# Patient Record
Sex: MALE | Race: BLACK OR AFRICAN AMERICAN | NOT HISPANIC OR LATINO | Employment: UNEMPLOYED | URBAN - METROPOLITAN AREA
[De-identification: names, ages, dates, MRNs, and addresses within clinical notes are randomized per-mention and may not be internally consistent; named-entity substitution may affect disease eponyms.]

---

## 2020-09-14 PROBLEM — N52.9 ERECTILE DYSFUNCTION: Status: ACTIVE | Noted: 2020-09-14

## 2020-09-14 PROBLEM — N18.4 CKD (CHRONIC KIDNEY DISEASE) STAGE 4, GFR 15-29 ML/MIN: Status: ACTIVE | Noted: 2020-09-14

## 2020-09-14 PROBLEM — I25.10 CORONARY ARTERY DISEASE INVOLVING NATIVE CORONARY ARTERY OF NATIVE HEART WITHOUT ANGINA PECTORIS: Status: ACTIVE | Noted: 2020-09-14

## 2020-09-14 PROBLEM — E78.5 HYPERLIPIDEMIA: Status: ACTIVE | Noted: 2020-09-14

## 2020-09-14 PROBLEM — Z82.49 FAMILY HISTORY OF HEART DISEASE: Status: ACTIVE | Noted: 2020-09-14

## 2020-09-14 PROBLEM — I63.9 CEREBROVASCULAR ACCIDENT (CVA): Status: ACTIVE | Noted: 2020-09-14

## 2020-09-14 PROBLEM — Z95.1 S/P CABG (CORONARY ARTERY BYPASS GRAFT): Status: ACTIVE | Noted: 2020-09-14

## 2020-09-14 PROBLEM — N18.4 CKD (CHRONIC KIDNEY DISEASE), STAGE IV: Status: ACTIVE | Noted: 2020-09-14

## 2020-09-14 PROBLEM — I10 ESSENTIAL HYPERTENSION: Status: ACTIVE | Noted: 2020-09-14

## 2020-09-14 PROBLEM — Z87.891 HISTORY OF TOBACCO ABUSE: Status: ACTIVE | Noted: 2020-09-14

## 2020-09-14 PROBLEM — R06.09 DYSPNEA ON EXERTION: Status: ACTIVE | Noted: 2020-09-14

## 2020-11-08 PROBLEM — Z79.01 ANTICOAGULATED: Status: RESOLVED | Noted: 2020-11-08 | Resolved: 2020-11-08

## 2020-11-08 PROBLEM — I65.23 BILATERAL CAROTID ARTERY STENOSIS: Status: ACTIVE | Noted: 2020-11-08

## 2020-11-08 PROBLEM — D64.9 ANEMIA: Status: ACTIVE | Noted: 2020-11-08

## 2020-11-08 PROBLEM — I51.7 LEFT VENTRICULAR HYPERTROPHY: Status: ACTIVE | Noted: 2020-11-08

## 2020-11-08 PROBLEM — I51.89 DIASTOLIC DYSFUNCTION: Status: ACTIVE | Noted: 2020-11-08

## 2020-11-08 PROBLEM — Z79.01 ANTICOAGULATED: Status: ACTIVE | Noted: 2020-11-08

## 2020-11-08 PROBLEM — R00.1 BRADYCARDIA: Status: ACTIVE | Noted: 2020-11-08

## 2020-11-08 PROBLEM — I51.89 LEFT VENTRICULAR HYPOKINESIS: Status: ACTIVE | Noted: 2020-11-08

## 2020-11-09 PROBLEM — N18.4 CKD (CHRONIC KIDNEY DISEASE) STAGE 4, GFR 15-29 ML/MIN: Status: RESOLVED | Noted: 2020-09-14 | Resolved: 2020-11-09

## 2020-11-09 PROBLEM — Z79.01 ON ANTICOAGULANT THERAPY: Status: RESOLVED | Noted: 2020-11-08 | Resolved: 2020-11-09

## 2020-12-04 PROBLEM — N18.4 CKD (CHRONIC KIDNEY DISEASE) STAGE 4, GFR 15-29 ML/MIN: Status: ACTIVE | Noted: 2020-12-04

## 2021-02-11 PROBLEM — I42.9 CARDIOMYOPATHY: Status: ACTIVE | Noted: 2021-02-11

## 2021-02-11 PROBLEM — I63.9 CEREBROVASCULAR ACCIDENT (CVA): Status: RESOLVED | Noted: 2020-09-14 | Resolved: 2021-02-11

## 2021-02-11 PROBLEM — I50.20 HEART FAILURE WITH REDUCED EJECTION FRACTION, NYHA CLASS II: Status: ACTIVE | Noted: 2021-02-11

## 2021-02-11 PROBLEM — I51.89 LEFT VENTRICULAR HYPOKINESIS: Status: RESOLVED | Noted: 2020-11-08 | Resolved: 2021-02-11

## 2021-02-11 PROBLEM — N18.4 CKD (CHRONIC KIDNEY DISEASE), STAGE IV: Status: RESOLVED | Noted: 2020-09-14 | Resolved: 2021-02-11

## 2021-02-12 ENCOUNTER — NURSE TRIAGE (OUTPATIENT)
Dept: ADMINISTRATIVE | Facility: CLINIC | Age: 51
End: 2021-02-12

## 2021-05-12 PROBLEM — Z91.199 NONCOMPLIANCE: Status: ACTIVE | Noted: 2021-05-12

## 2021-05-18 PROBLEM — N17.9 ACUTE KIDNEY INJURY SUPERIMPOSED ON CHRONIC KIDNEY DISEASE: Status: ACTIVE | Noted: 2020-12-04

## 2021-05-18 PROBLEM — N18.9 ACUTE KIDNEY INJURY SUPERIMPOSED ON CHRONIC KIDNEY DISEASE: Status: ACTIVE | Noted: 2020-12-04

## 2021-05-18 PROBLEM — R07.89 OTHER CHEST PAIN: Status: ACTIVE | Noted: 2021-05-18

## 2021-05-18 PROBLEM — I21.4 NSTEMI (NON-ST ELEVATED MYOCARDIAL INFARCTION): Status: ACTIVE | Noted: 2021-05-18

## 2021-05-18 PROBLEM — R07.9 CHEST PAIN: Status: ACTIVE | Noted: 2021-05-18

## 2021-05-19 PROBLEM — N18.9 ACUTE KIDNEY INJURY SUPERIMPOSED ON CHRONIC KIDNEY DISEASE: Status: RESOLVED | Noted: 2020-12-04 | Resolved: 2021-05-19

## 2021-05-19 PROBLEM — N17.9 ACUTE KIDNEY INJURY SUPERIMPOSED ON CHRONIC KIDNEY DISEASE: Status: RESOLVED | Noted: 2020-12-04 | Resolved: 2021-05-19

## 2021-05-19 PROBLEM — I21.4 NSTEMI (NON-ST ELEVATED MYOCARDIAL INFARCTION): Status: RESOLVED | Noted: 2021-05-18 | Resolved: 2021-05-19

## 2021-05-19 PROBLEM — R07.89 OTHER CHEST PAIN: Status: RESOLVED | Noted: 2021-05-18 | Resolved: 2021-05-19

## 2021-05-21 PROBLEM — Z86.73 HISTORY OF STROKE: Status: ACTIVE | Noted: 2021-05-21

## 2021-06-09 ENCOUNTER — TELEPHONE (OUTPATIENT)
Dept: TRANSPLANT | Facility: CLINIC | Age: 51
End: 2021-06-09

## 2022-02-15 PROBLEM — R13.10 DYSPHAGIA: Status: ACTIVE | Noted: 2022-02-15

## 2023-02-14 ENCOUNTER — TELEPHONE (OUTPATIENT)
Dept: TRANSPLANT | Facility: CLINIC | Age: 53
End: 2023-02-14
Payer: COMMERCIAL

## 2023-02-16 ENCOUNTER — TELEPHONE (OUTPATIENT)
Dept: TRANSPLANT | Facility: CLINIC | Age: 53
End: 2023-02-16
Payer: COMMERCIAL

## 2023-02-16 NOTE — TELEPHONE ENCOUNTER
Contacted patient to review initial intake information. Patient reports the followin. Can you walk up a flight of stairs without getting short of breath or stopping? No   2. Can you walk one block without getting short of breath or having to stop? No   3. Do you use oxygen? No   4. Do you use a cane, walker, or wheel chair to assist in mobility? No   5. Have you been hospitalized or had recent surgery in the last 6 months? No    A. Stroke   B. Heart surgery or heart catheterization   C. Broken bone  6. Do you have any cuts, open sores (ulcers), or wounds anywhere on your body? No   7. Do you go to dialysis? Yes What days? T,T,S  8. Preferred appointment day? Weds  9. Caregiver? Wife (Mariajose)      Patient is aware the next steps will include completing records and compliance verification. Patient is aware once provider review and insurance authorization is received we will contact patient to schedule initial visit. Patient is aware that initial visit will begin prior to / at 7 am and will conclude at approximately 3 pm on date of appointment. All questions answered at this time.

## 2023-02-20 ENCOUNTER — TELEPHONE (OUTPATIENT)
Dept: TRANSPLANT | Facility: CLINIC | Age: 53
End: 2023-02-20

## 2023-03-15 ENCOUNTER — TELEPHONE (OUTPATIENT)
Dept: TRANSPLANT | Facility: CLINIC | Age: 53
End: 2023-03-15
Payer: COMMERCIAL

## 2023-03-28 ENCOUNTER — TELEPHONE (OUTPATIENT)
Dept: TRANSPLANT | Facility: CLINIC | Age: 53
End: 2023-03-28
Payer: COMMERCIAL

## 2023-03-30 DIAGNOSIS — Z76.82 ORGAN TRANSPLANT CANDIDATE: Primary | ICD-10-CM

## 2023-04-19 ENCOUNTER — TELEPHONE (OUTPATIENT)
Dept: TRANSPLANT | Facility: CLINIC | Age: 53
End: 2023-04-19
Payer: COMMERCIAL

## 2023-04-19 NOTE — TELEPHONE ENCOUNTER
MA notes per adherence form/Epifanio Barragan (LUCILLE) [facility] ph# [phone]    FOR THE PAST THREE MONTHS:    5-AMA's 01/21/23 22 min, 02/04/23 126 min, 03/14/23 77 min, 03/21/23 35 min, per pt request 02/11/23 45 min, for doctor appt     1-No-shows 01/14/23 due to illness or trauma.    No concerns with care giving, transportation, or mental health    Brought over to clinic to be scanned in.    Antonette Leija  Abdominal Transplant MA

## 2023-05-03 ENCOUNTER — HOSPITAL ENCOUNTER (OUTPATIENT)
Dept: RADIOLOGY | Facility: HOSPITAL | Age: 53
Discharge: HOME | End: 2023-05-03
Payer: MEDICARE

## 2023-05-03 ENCOUNTER — OFFICE VISIT (OUTPATIENT)
Dept: TRANSPLANT | Facility: CLINIC | Age: 53
End: 2023-05-03
Payer: MEDICARE

## 2023-05-03 ENCOUNTER — TELEPHONE (OUTPATIENT)
Dept: TRANSPLANT | Facility: CLINIC | Age: 53
End: 2023-05-03
Payer: MEDICARE

## 2023-05-03 ENCOUNTER — HOSPITAL ENCOUNTER (OUTPATIENT)
Dept: RADIOLOGY | Facility: HOSPITAL | Age: 53
Discharge: HOME | End: 2023-05-03
Attending: NURSE PRACTITIONER
Payer: MEDICARE

## 2023-05-03 VITALS
RESPIRATION RATE: 16 BRPM | WEIGHT: 113.75 LBS | SYSTOLIC BLOOD PRESSURE: 155 MMHG | HEIGHT: 66 IN | TEMPERATURE: 97 F | OXYGEN SATURATION: 97 % | HEART RATE: 73 BPM | DIASTOLIC BLOOD PRESSURE: 88 MMHG | BODY MASS INDEX: 18.28 KG/M2

## 2023-05-03 DIAGNOSIS — Z01.818 PRE-TRANSPLANT EVALUATION FOR KIDNEY TRANSPLANT: Primary | ICD-10-CM

## 2023-05-03 DIAGNOSIS — I10 ESSENTIAL HYPERTENSION: ICD-10-CM

## 2023-05-03 DIAGNOSIS — Z76.82 ORGAN TRANSPLANT CANDIDATE: ICD-10-CM

## 2023-05-03 DIAGNOSIS — Z87.891 HISTORY OF TOBACCO ABUSE: ICD-10-CM

## 2023-05-03 DIAGNOSIS — E78.2 MIXED HYPERLIPIDEMIA: ICD-10-CM

## 2023-05-03 DIAGNOSIS — N18.6 ESRD ON HEMODIALYSIS: ICD-10-CM

## 2023-05-03 DIAGNOSIS — Z99.2 ESRD ON HEMODIALYSIS: ICD-10-CM

## 2023-05-03 DIAGNOSIS — I25.10 CORONARY ARTERY DISEASE INVOLVING NATIVE CORONARY ARTERY OF NATIVE HEART WITHOUT ANGINA PECTORIS: ICD-10-CM

## 2023-05-03 PROCEDURE — 99203 OFFICE O/P NEW LOW 30 MIN: CPT | Mod: S$GLB,TXP,, | Performed by: TRANSPLANT SURGERY

## 2023-05-03 PROCEDURE — 71046 X-RAY EXAM CHEST 2 VIEWS: CPT | Mod: TC,TXP

## 2023-05-03 PROCEDURE — 1159F PR MEDICATION LIST DOCUMENTED IN MEDICAL RECORD: ICD-10-PCS | Mod: CPTII,S$GLB,TXP, | Performed by: NURSE PRACTITIONER

## 2023-05-03 PROCEDURE — 1160F RVW MEDS BY RX/DR IN RCRD: CPT | Mod: CPTII,S$GLB,TXP, | Performed by: NURSE PRACTITIONER

## 2023-05-03 PROCEDURE — 99205 OFFICE O/P NEW HI 60 MIN: CPT | Mod: S$GLB,TXP,, | Performed by: PHYSICIAN ASSISTANT

## 2023-05-03 PROCEDURE — 72170 X-RAY EXAM OF PELVIS: CPT | Mod: TC,TXP

## 2023-05-03 PROCEDURE — 3008F BODY MASS INDEX DOCD: CPT | Mod: CPTII,S$GLB,TXP, | Performed by: NURSE PRACTITIONER

## 2023-05-03 PROCEDURE — 76770 US EXAM ABDO BACK WALL COMP: CPT | Mod: TC,TXP

## 2023-05-03 PROCEDURE — 93978 VASCULAR STUDY: CPT | Mod: TC,TXP

## 2023-05-03 PROCEDURE — 1160F PR REVIEW ALL MEDS BY PRESCRIBER/CLIN PHARMACIST DOCUMENTED: ICD-10-PCS | Mod: CPTII,S$GLB,TXP, | Performed by: NURSE PRACTITIONER

## 2023-05-03 PROCEDURE — 93978 VASCULAR STUDY: CPT | Mod: 26,TXP,, | Performed by: STUDENT IN AN ORGANIZED HEALTH CARE EDUCATION/TRAINING PROGRAM

## 2023-05-03 PROCEDURE — 3079F DIAST BP 80-89 MM HG: CPT | Mod: CPTII,S$GLB,TXP, | Performed by: NURSE PRACTITIONER

## 2023-05-03 PROCEDURE — 99205 OFFICE O/P NEW HI 60 MIN: CPT | Mod: S$GLB,TXP,, | Performed by: NURSE PRACTITIONER

## 2023-05-03 PROCEDURE — 3079F PR MOST RECENT DIASTOLIC BLOOD PRESSURE 80-89 MM HG: ICD-10-PCS | Mod: CPTII,S$GLB,TXP, | Performed by: NURSE PRACTITIONER

## 2023-05-03 PROCEDURE — 3077F SYST BP >= 140 MM HG: CPT | Mod: CPTII,S$GLB,TXP, | Performed by: NURSE PRACTITIONER

## 2023-05-03 PROCEDURE — 72170 X-RAY EXAM OF PELVIS: CPT | Mod: 26,TXP,, | Performed by: RADIOLOGY

## 2023-05-03 PROCEDURE — 72170 XR PELVIS ROUTINE AP: ICD-10-PCS | Mod: 26,TXP,, | Performed by: RADIOLOGY

## 2023-05-03 PROCEDURE — 71046 X-RAY EXAM CHEST 2 VIEWS: CPT | Mod: 26,TXP,, | Performed by: RADIOLOGY

## 2023-05-03 PROCEDURE — 4010F PR ACE/ARB THEARPY RXD/TAKEN: ICD-10-PCS | Mod: CPTII,S$GLB,TXP, | Performed by: NURSE PRACTITIONER

## 2023-05-03 PROCEDURE — 1159F MED LIST DOCD IN RCRD: CPT | Mod: CPTII,S$GLB,TXP, | Performed by: NURSE PRACTITIONER

## 2023-05-03 PROCEDURE — 99999 PR PBB SHADOW E&M-EST. PATIENT-LVL IV: CPT | Mod: PBBFAC,TXP,, | Performed by: NURSE PRACTITIONER

## 2023-05-03 PROCEDURE — 99999 PR PBB SHADOW E&M-EST. PATIENT-LVL IV: ICD-10-PCS | Mod: PBBFAC,TXP,, | Performed by: NURSE PRACTITIONER

## 2023-05-03 PROCEDURE — 76770 US RETROPERITONEAL COMPLETE: ICD-10-PCS | Mod: 26,TXP,, | Performed by: STUDENT IN AN ORGANIZED HEALTH CARE EDUCATION/TRAINING PROGRAM

## 2023-05-03 PROCEDURE — 99205 PR OFFICE/OUTPT VISIT, NEW, LEVL V, 60-74 MIN: ICD-10-PCS | Mod: S$GLB,TXP,, | Performed by: NURSE PRACTITIONER

## 2023-05-03 PROCEDURE — 93978 US DOPP ILIACS BILATERAL: ICD-10-PCS | Mod: 26,TXP,, | Performed by: STUDENT IN AN ORGANIZED HEALTH CARE EDUCATION/TRAINING PROGRAM

## 2023-05-03 PROCEDURE — 99205 PR OFFICE/OUTPT VISIT, NEW, LEVL V, 60-74 MIN: ICD-10-PCS | Mod: S$GLB,TXP,, | Performed by: PHYSICIAN ASSISTANT

## 2023-05-03 PROCEDURE — 99203 PR OFFICE/OUTPT VISIT, NEW, LEVL III, 30-44 MIN: ICD-10-PCS | Mod: S$GLB,TXP,, | Performed by: TRANSPLANT SURGERY

## 2023-05-03 PROCEDURE — 4010F ACE/ARB THERAPY RXD/TAKEN: CPT | Mod: CPTII,S$GLB,TXP, | Performed by: NURSE PRACTITIONER

## 2023-05-03 PROCEDURE — 76770 US EXAM ABDO BACK WALL COMP: CPT | Mod: 26,TXP,, | Performed by: STUDENT IN AN ORGANIZED HEALTH CARE EDUCATION/TRAINING PROGRAM

## 2023-05-03 PROCEDURE — 3077F PR MOST RECENT SYSTOLIC BLOOD PRESSURE >= 140 MM HG: ICD-10-PCS | Mod: CPTII,S$GLB,TXP, | Performed by: NURSE PRACTITIONER

## 2023-05-03 PROCEDURE — 71046 XR CHEST PA AND LATERAL: ICD-10-PCS | Mod: 26,TXP,, | Performed by: RADIOLOGY

## 2023-05-03 PROCEDURE — 3008F PR BODY MASS INDEX (BMI) DOCUMENTED: ICD-10-PCS | Mod: CPTII,S$GLB,TXP, | Performed by: NURSE PRACTITIONER

## 2023-05-03 RX ORDER — HYDRALAZINE HYDROCHLORIDE 25 MG/1
25 TABLET, FILM COATED ORAL 3 TIMES DAILY
COMMUNITY

## 2023-05-03 NOTE — PROGRESS NOTES
INITIAL PATIENT EDUCATION NOTE    Mr. Arcelia Marin was seen in pre-kidney transplant clinic for evaluation for kidney, kidney/pancreas or pancreas only transplant.  The patient attended a an individual video education session that discussed/reviewed the following aspects of transplantation: evaluation including diagnostic and laboratory testing,( Chemistries, Hematology, Serologies including HIV and Hepatitis and HLA) required for transplantation and selection committee process, UNOS waitlist management/multiple listings, types of organs offered (KDPI < 85%, KDPI > 85%, PHS risk, DCD, HCV+, HIV+ for HIV+ recipients and enbloc/dual), financial aspects, surgical procedures, dietary instruction pre- and post-transplant, health maintenance pre- and post-transplant, post-transplant hospitalization and outpatient follow-up, potential to participate in a research protocol, and medication management and side effects.  A question and answer session was provided after the presentation.    The patient was seen by all members of the multi-disciplinary team to include: Nephrologist/ANDREI, Surgeon, , Transplant Coordinator, , Pharmacist and Dietician (if applicable).    The patient reviewed and signed all consents for evaluation which were witnessed and sent to scanning into the [facility] chart.    The patient was given an education book and plan for further evaluation based on his individual assessment.      Reviewed program requirement for complete COVID vaccination with documentation prior to listing.  COVID education information reviewed with patient. Patient encouraged to be up to date on all vaccinations.       The patient was informed that the transplant team would manage immediate post op pain. If the patient requires long term pain management, they will need to have that pain management addressed by their PCP or previous provider who wrote for long term pain medicines.    The patient  was encouraged to call with any questions or concerns.

## 2023-05-03 NOTE — PROGRESS NOTES
Transplant Recipient Adult Psychosocial Assessment    Pt presents to clinic visit with pt's wife, Mariajose Marin.     Arcelia DOMINGUEZ [address]  [facility] [address]  Telephone Information:   Mobile [phone]   Home  [phone] (home)  Work  There is no work phone number on file.  E-mail  No e-mail address on record    Sex: male  YOB: 1970  Age: 52 y.o.    Encounter Date: 5/3/2023  [place] Citizen: yes  Primary Language: English   Needed: no    Emergency Contact:  Name: Mariajose Marin  Relationship: wife  Address: Same as pt  Phone Numbers:  [phone] (mobile)    Family/Social Support:   Number of dependents/: Pt denies.   Marital history: Pt  1 time to current spouse, Mariajose Marin, for 27 years.   Other family dynamics: Pt reports both parents are . Pt has 1 sister and 1 brother- in [facility], close relationship with both. Pt has 3 adult stepchildren: Destin Schulte, Selwyn Ellison, Carrie Scot ([place], [place], & [place]).     Household Composition:  Name: Mariajose Marin; works FT at [employer], [phone]  Age: 56  Relationship: wife  Does person drive? yes    Do you and your caregivers have access to reliable transportation? yes  PRIMARY CAREGIVER: Pt's wife, Mariajose Marin, will be primary caregiver, phone number [phone]     Able to take time off work without financial concerns: yes. Pt's wife reports working at [employer] for 10 years and has PTO, STD, LTD, and FMLA available.     Additional Significant Others who will Assist with Transplant:  Pt unable to determine backup caregiver at this time. Pt agrees to update SW team as needed with any changes to caregiver plan.     Living Will: no .  Healthcare Power of : no  Advance Directives on file: <<no information> per medical record.  Verbally reviewed LW/HCPA information.   provided patient with copy of LW/HCPA documents and provided education on completion of  "forms    Living Donors: No. Education and resource information given to patient.    Highest Education Level: High School (9-12) or GED  Reading Ability: 12th grade  Reports difficulty with: N/A  Learns Best By:  Multisensory info      Status: no  VA Benefits: no     Working for Income: No  If no, reason not working: Disability  Spouse/Significant Other Employment: Pt's wife reports working at [employer] for 10 years and has PTO, STD, LTD, and FMLA available.     Disabled: yes: date disability began: , due to: ESRD.    Monthly Income:   Disability: $1600 /month  Pt's wife's wages- around $1,000/month    Insurance:   Payer/Plan Subscr  Sex Relation Sub. Ins. ID Effective Group Num   1. [employer] MANAGE* MOISÉS KNOWLES * 1970 Male Self [id number]                                   [address] [address]     Primary Insurance (for UNOS reporting): Public Insurance - Medicare & Choice  Secondary Insurance (for UNOS reporting): None    Dialysis Adherence: Patient reports on TTS schedule at [facility] ([phone]).  Dialysis center reports over last three months that the patient has had 5 AMAs, 1 no-shows, and no concerns with caregiving, transportation, or mental health. Full compliance report found under "Media" tab.   LUCILLE discussed importance of satisfactory compliance with pt. Pt reports shortened treatments due to sickness and throwing up from having too much weight pulled off during dialysis treatments. Pt states he is unable to have PD due to scar tissue.   LUCILLE spoke with dialysis nurse, Daiana RN who reports pt is "very compliant" with treatments, binders, and diet.     Infusion Service: patient utilizing? yes, iron as needed  Home Health: patient utilizing? no  DME: yes, [facility]  Pulmonary/Cardiac Rehab: Pt denies   ADLS:  Pt reports independent with all ADLS, drives, and handles own medication management.      Adherence:    Pt reports is highly compliant with all medical appointments and " "instructions.  Adherence education and counseling provided.     Per History Section:  Past Medical History:   Diagnosis Date    Allergy     Anemia     CHF (congestive heart failure)     Coronary artery disease     CVA (cerebral vascular accident)     Encounter for blood transfusion     Heart disease     Heart failure with reduced ejection fraction, NYHA class II 02/11/2021    Hypertension     Tobacco dependence     Vitamin D deficiency      Social History     Tobacco Use    Smoking status: Every Day     Packs/day: 0.50     Types: Cigarettes    Smokeless tobacco: Former   Substance Use Topics    Alcohol use: Yes     Comment: 1-2 beers every other day     Social History     Substance and Sexual Activity   Drug Use Not Currently     Social History     Substance and Sexual Activity   Sexual Activity Not Currently    Partners: Female       Per Today's Psychosocial:  Tobacco: Pt reports currently smokes 1/2 ppd. Pt reports previously quit once before and believes can quit on own.   Alcohol: Pt reports drinking 2 beers/week.   Illicit Drugs/Non-prescribed Medications: Pt reports smokes marijuana 2-3x/week and reports, "I can quit anytime".       Arrests/DWI/Treatment/Rehab: yes, Pt reports 1 DUI around age 22 and completed classes and had 10 session with a counselor.    Psychiatric History:    Mental Health: Pt denies current or history of mental health concerns.    Psychiatrist/Counselor: Pt reports 10 sessions with counselor after DUI 30 years ago.   Medications:  Pt denies  Suicide/Homicide Issues: Pt denies history or current SI/HI.      Knowledge: Patient states having clear understanding and realistic expectations regarding the potential risks and potential benefits of organ transplantation and organ donation and agrees to discuss with health care team members and support system members, as well as to utilize available resources and express questions and/or concerns in order to further facilitate the pt informed " decision-making.  Resources and information provided and reviewed.     Patient is aware of [facility]'s affiliation and/or partnership with agencies in home health care, LTAC, SNF, [facility], and other hospitals and clinics.    Understanding: Patient reports having a clear understanding of the many lifetime commitments involved with being a transplant recipient, including costs, compliance, medications, lab work, procedures, appointments, concrete and financial planning, preparedness, timely and appropriate communication of concerns, abstinence (ETOH, tobacco, illicit non-prescribed drugs), adherence to all health care team recommendations, support system and caregiver involvement, appropriate and timely resource utilization and follow-through, mental health counseling as needed/recommended, and patient and caregiver responsibilities.  Social Service Handbook, resources and detailed educational information provided and reviewed.  Educational information provided.    Patient also reports current and expected compliance with health care regime, and patient states having a clear understanding of the importance of compliance.  Patient reports a clear understanding that risks and benefits may be involved with organ transplantation and with organ donation.  Patient also reports clear understanding that psychosocial risk factors may affect patient, and include but are not limited to feelings of depression, generalized anxiety, anxiety regarding dependence on others, post traumatic stress disorder, feelings of guilt and other emotional and/or mental concerns, and/or exacerbation of existing mental health concerns.  Detailed resources provided and discussed.  Patient agrees to access appropriate resources in a timely manner as needed and/or as recommended, and to communicate concerns appropriately.  Patient also reports a clear understanding of treatment options available.      Patient and caregiver received education in a group  "setting.   reviewed education, provided additional information, and answered questions.    Feelings or Concerns: Pt denies any concerns at this time. Pt's wife reports feeling "emotional" and SW emailed pt a flyer with transplant patient and caregiver support groups.     Coping: Identify Patient & Caregiver Strategies to [place]:   1. In the past, coping with major surgery and/or related stress - Fishing, being outdoors, tagga, watching action & horror movies   2. Currently & Pre-transplant - Fishing, being outdoors, tagga, watching action & horror movies   3. At the time of surgery - romy   4. During post-Transplant & Recovery Period - Romy, movies, family support    Goals: Pt reports goal of getting off dialysis.  Patient referred to Vocational Rehabilitation.    Interview Behavior: Patient and caregiver presents as alert and oriented x 4, pleasant, good eye contact, well groomed, recall good, concentration/judgement good, average intelligence, calm, communicative, cooperative, and asking and answering questions appropriately.  Pt's wife, Mariajose, present for duration of interview with pt's permission.          Transplant Social Work - Candidacy  Assessment/Plan:     Psychosocial Suitability: Based on psychosocial risk factors, patient presents as Low risk for kidney transplant due to established caregiver plan, supportive family system, no reported history of substance use, adequate insurance coverage and personal finances to cover transplant costs, and realistic beliefs and expectations regarding risks and benefits of transplant.     Recommendations/Additional Comments:   Establish backup caregiver plan.  SW recommends pt abstain from tobacco products, ETOH, and illicit drugs.  SW recommends fundraise.   SW recommends pt remain aware of any changes in mental health and update SW team as needed.    Melissa Khoury LMSW             "

## 2023-05-03 NOTE — TELEPHONE ENCOUNTER
Reviewed pt transplant labs.  Notified dialysis unit dietitian of the following abnormal labs via fax and requested their most recent nutrition note on this pt.  Once this note is received it will be scanned into pt's chart.     Cholesterol 251

## 2023-05-03 NOTE — LETTER
May 5, 2023        Sam Berrios  [address] [facility] [place] [address]  Phone: [phone]  Fax: [phone]             Kade Sanders- Transplant [facility]  [address]  [facility] [address]  Phone: [phone]   Patient: Arcelia Marin   MR Number: [id number]   YOB: 1970   Date of Visit: 5/3/2023       Dear Dr. Sam Berrios    Thank you for referring Arcelia Marin to me for evaluation. Attached you will find relevant portions of my assessment and plan of care.    If you have questions, please do not hesitate to call me. I look forward to following Arcelia Marin along with you.    Sincerely,    Alona Londono, KHUSHBU    Enclosure    If you would like to receive this communication electronically, please contact [email or web address] or [phone] to request [employer] [place] [employer] access.    [employer] [facility] [place] [employer] is a tool which provides read-only access to select patient information with whom you have a relationship. Its easy to use and provides real time access to review your patients record including encounter summaries, notes, results, and demographic information.    If you feel you have received this communication in error or would no longer like to receive these types of communications, please e-mail [email or web address]

## 2023-05-03 NOTE — PROGRESS NOTES
PRE-TRANSPLANT INFECTIOUS DISEASE CONSULT    Reason for Visit:  Pre-transplant evaluation  Referring Provider: Dr. Sam Berrios     History of Present Illness:    52 y.o. male with a history of ESRD 2/2 HTN presents for pre-kidney transplant evaluation. Pt is on HD has LUE AVG. No complications or infections with the graft.     Infectious History:  Recent hospital admissions: No  Recent infections: No  Recent or current antibiotic use: No. Took pcn as a child and broke out in hives. Denies taking any abx since then.   History of recurrent infections *(sinus / pneumonia / UTI / SBP)*: No  Recent dental infections, issues or procedures: No  History of chicken pox: No  History of shingles: No  History of STI: No  History of COVID infection: No    History of Immunosuppression:  Prior chemotherapy / immunosuppression: No  Prior transplant: No  History of splenectomy: No    Tuberculosis:  Prior screening for latent TB: No  Prior diagnosis of latent TB: No  Risk factors for TB *known exposure, incarceration, homelessness*: No    Geographical exposures:  Currently lives in [place] with wife  Lived in the following states: [place]   Lived or travelled to the [facility] [place]: No  International travel: No  Travel-associated illness: No    Social/Environmental:  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  No  Tobacco: Yes 1/2 pack a day x 2 years   Alcohol: Yes socially   Recreational drug use:  Yes smoke marijuana for appetite stimulant    Past Histories:   Past Medical History:   Diagnosis Date    Allergy     Anemia     CHF (congestive heart failure)     Coronary artery disease     CVA (cerebral vascular accident)     Encounter for blood transfusion     Heart disease     Heart failure with reduced ejection fraction, NYHA class II  02/11/2021    Hypertension     Tobacco dependence     Vitamin D deficiency      Past Surgical History:   Procedure Laterality Date    CORONARY ARTERY BYPASS GRAFT      coronary bypass surgery      KNEE SURGERY Left     NECK SURGERY       Family History   Problem Relation Age of Onset    Heart attack Mother     Heart attack Father      Social History     Tobacco Use    Smoking status: Every Day     Packs/day: 0.50     Types: Cigarettes    Smokeless tobacco: Former   Substance Use Topics    Alcohol use: Yes     Comment: 1-2 beers every other day    Drug use: Not Currently     Review of patient's allergies indicates:   Allergen Reactions    Penicillins Hives         Immunization History:  Received all childhood vaccines: Yes  All household members receive annual flu vaccine: Yes  All household members are up to date on COVID vaccine: pt had covid vaccine, not spouse     Immunization History   Administered Date(s) Administered    Pneumococcal Conjugate - 13 Valent 09/14/2020          Current antibiotics:  Antibiotics (From admission, onward)      None              Review of Systems  Review of Systems   Constitutional: Negative for chills, decreased appetite, fever, malaise/fatigue, night sweats, weight gain and weight loss.   HENT:  Negative for congestion, ear pain, hearing loss, hoarse voice, sore throat and tinnitus.    Eyes:  Negative for blurred vision, pain, vision loss in left eye, vision loss in right eye and visual disturbance.   Cardiovascular:  Negative for chest pain, dyspnea on exertion, leg swelling and palpitations.   Respiratory:  Negative for cough, shortness of breath, sputum production and wheezing.    Skin:  Negative for dry skin, itching, rash and suspicious lesions.   Musculoskeletal:  Negative for back pain, joint pain, myalgias and neck pain.   Gastrointestinal:  Negative for abdominal pain, constipation, diarrhea, heartburn, nausea and vomiting.   Genitourinary:  Negative for dysuria, flank  pain, frequency, hematuria, hesitancy and urgency.   Neurological:  Negative for dizziness, headaches, numbness, paresthesias and weakness.   Psychiatric/Behavioral:  Negative for depression and memory loss. The patient does not have insomnia and is not nervous/anxious.         Objective  Physical Exam  Vitals and nursing note reviewed.   Constitutional:       General: He is not in acute distress.     Appearance: He is well-developed. He is not diaphoretic.   HENT:      Head: Normocephalic and atraumatic.   Eyes:      Pupils: Pupils are equal, round, and reactive to light.   Cardiovascular:      Rate and Rhythm: Normal rate and regular rhythm.      Heart sounds: Normal heart sounds. No murmur heard.    No friction rub. No gallop.   Pulmonary:      Effort: Pulmonary effort is normal. No respiratory distress.      Breath sounds: Normal breath sounds. No wheezing or rales.   Chest:      Chest wall: No tenderness.   Abdominal:      General: Bowel sounds are normal. There is no distension.      Palpations: There is no mass.      Tenderness: There is no abdominal tenderness. There is no guarding.   Musculoskeletal:         General: No deformity. Normal range of motion.      Cervical back: Normal range of motion and neck supple.   Skin:     General: Skin is warm and dry.      Findings: No erythema or rash.   Neurological:      Mental Status: He is alert and oriented to person, place, and time.   Psychiatric:         Behavior: Behavior normal.         Thought Content: Thought content normal.         Judgment: Judgment normal.         Labs:    CBC:   Lab Results   Component Value Date    WBC 8.86 05/03/2023    HGB 13.5 (L) 05/03/2023    HCT 40.4 05/03/2023     (H) 05/03/2023     05/03/2023    GRAN 6.8 05/03/2023    GRAN 76.7 (H) 05/03/2023    LYMPH 1.4 05/03/2023    LYMPH 15.3 (L) 05/03/2023    MONO 0.6 05/03/2023    MONO 6.2 05/03/2023    EOSINOPHIL 1.2 05/03/2023       Syphilis screening: No results found  for: RPR, PRPQ, FTAABS     TB screening: No results found for: TBGOLDPLUS, TSPOTSCREN    HIV screening:   Lab Results   Component Value Date    JNO46PEIV Non-reactive 05/03/2023       Strongyloides IgG: No results found for: STRONGANTIGG    Hepatitis Serologies:   Lab Results   Component Value Date    HEPAIGG Non-reactive 05/03/2023    HEPBCAB Non-reactive 05/03/2023    HEPCAB Non-reactive 05/03/2023        Varicella IgG: No results found for: VARICELLAINT      Assessment and Plan    1. Risks of Infection: Available serologies were reviewed. No unusual risks of infection or significant barriers to transplantation were identified from the infectious disease standpoint given the information available at this time.    - Acute infectious issues: None   - Pending serologies: Hepatitis B surface Ab, Quantiferon gold / T-spot, and RPR   - Please call if any pending serologic testing is positive.    2. Immunizations:  Based on the patient's immunization history and serologies, the following immunizations are recommended:  - Hepatitis A    Patient does not have immunity to hepatitis A    Vaccination ordered today: Yes   - Hepatitis B    Patient does have immunity to hepatitis B    Vaccination ordered today: No. Reason for not ordering: Immunity   - COVID    Current CDC vaccination recommendations were discussed with the patient   - Annual high dose influenza     Vaccination ordered today: No. Reason for not ordering: vaccination up to date   - Prevnar 20    Vaccination ordered today: No. Reason for not ordering: vaccination up to date   - Tdap    Vaccination ordered today: Yes   - Shingrix    Vaccination ordered today: Yes    Recommended Pre-Transplant Immunization Schedule   Vaccine  0m 1m 2m 6m   Pneumococcal conjugate vaccine (Prevnar 20) X      Tetanus-diphtheria-pertussis (Tdap)* X      Hepatitis A Vaccine (Havrix)** X   X   Hepatitis B Vaccine (Heplisav)** X X     Influenza (annual) X      Zoster Recombinant Vaccine  (Shingrix) X  X           *Administer booster every 10 years.       **Administer if no immunity demonstrated on serologies               Patient will receive vaccines at local pharmacy. A written prescription was provided for all vaccine doses.     3. Counseling:   I discussed with the patient the risk for increased susceptibility to infections following transplantation including increased risk for infection right after transplant and if rejection should occur.  The patient has been counseled on the importance of vaccinations to decrease risk of infection and severe illness. Specific guidance has been provided to the patient regarding the patient's occupation, hobbies and activities to avoid future infectious complications.     4. Transplant Candidacy: Based on available information, there are no identified significant barriers to transplantation from an infectious disease standpoint.  Final determination of transplant candidacy will be made once evaluation is complete and reviewed by the Selection Committee.      Follow up with infectious disease as needed.       The total time for evaluation and management services performed on 05/03/2023 was greater than 35 minutes.

## 2023-05-03 NOTE — PROGRESS NOTES
Transplant Nephrology  Kidney Transplant Recipient Evaluation    Referring Physician: Sam Berrios  Current Nephrologist: Sam Berrios    Subjective:   CC:  Initial evaluation of kidney transplant candidacy.    HPI:  Mr. Marin is a 52 y.o. year old Black or  male who has presented to be evaluated as a potential kidney transplant recipient.  He has ESRD secondary to HTN.  Patient is currently on hemodialysis started on 6/23/2022. Patient is dialyzing on TTS schedule.  Patient reports that he is tolerating dialysis well.. He has a LUE AV graft for dialysis access.     Uncontrolled HTN for several years. Denies CVA.     CAD, PVD, on plavix  Documentation of CABG with triple vessel bypass x 2 (2017, 2018) by cardiology; however, patient denies ever having CABG. He has had femoral bypass.  HF-EF down to 25-30% in 2021    Current smoker, 1/2 PPD. Believes he would be able to quit, is not interested in smoking cessation program.    No potential donors at this time. Has never had a colonoscopy.    Remains active, denies CP or SOB with exertion. Not frail.    Current Outpatient Medications   Medication Sig Dispense Refill    amLODIPine (NORVASC) 10 MG tablet Take 1 tablet (10 mg total) by mouth once daily. 90 tablet 3    atorvastatin (LIPITOR) 80 MG tablet Take 1 tablet (80 mg total) by mouth every evening. 90 tablet 3    carvediloL (COREG) 25 MG tablet Take 1 tablet (25 mg total) by mouth 2 (two) times daily with meals. 180 tablet 3    clopidogreL (PLAVIX) 75 mg tablet Take 1 tablet (75 mg total) by mouth once daily. 90 tablet 3    furosemide (LASIX) 40 MG tablet Take 1 tablet (40 mg total) by mouth as needed. 90 tablet 11    hydrALAZINE (APRESOLINE) 25 MG tablet Take 25 mg by mouth 3 (three) times daily.      lisinopriL (PRINIVIL,ZESTRIL) 2.5 MG tablet Take 1 tablet (2.5 mg total) by mouth every evening. 90 tablet 3     No current facility-administered medications for this visit.       Past  Medical History:   Diagnosis Date    Allergy     Anemia     CHF (congestive heart failure)     Coronary artery disease     CVA (cerebral vascular accident)     Encounter for blood transfusion     Heart disease     Heart failure with reduced ejection fraction, NYHA class II 02/11/2021    Hypertension     Tobacco dependence     Vitamin D deficiency      Past Medical and Surgical History: Mr. Marin  has a past medical history of Allergy, Anemia, CHF (congestive heart failure), Coronary artery disease, CVA (cerebral vascular accident), Encounter for blood transfusion, Heart disease, Heart failure with reduced ejection fraction, NYHA class II, Hypertension, Tobacco dependence, and Vitamin D deficiency.  He has a past surgical history that includes coronary bypass surgery; Neck surgery; Coronary artery bypass graft; and Knee surgery (Left).    Past Social and Family History: Mr. Marin reports that he has been smoking cigarettes. He has been smoking an average of .5 packs per day. He has quit using smokeless tobacco. He reports current alcohol use. He reports that he does not currently use drugs. His family history includes Heart attack in his father and mother.    Review of Systems   Constitutional:  Positive for fatigue. Negative for activity change, appetite change and fever.   HENT:  Negative for congestion, mouth sores and sore throat.    Eyes:  Negative for visual disturbance.   Respiratory:  Negative for cough, chest tightness and shortness of breath.    Cardiovascular:  Negative for chest pain, palpitations and leg swelling.   Gastrointestinal:  Negative for abdominal distention, abdominal pain, constipation, diarrhea and nausea.   Genitourinary:  Negative for difficulty urinating, frequency and hematuria.   Musculoskeletal:  Negative for arthralgias and gait problem.   Skin:  Negative for wound.   Allergic/Immunologic: Negative for environmental allergies, food allergies and immunocompromised state.  "  Neurological:  Negative for dizziness, weakness and numbness.   Hematological:  Bruises/bleeds easily.        On plavix   Psychiatric/Behavioral:  Negative for sleep disturbance. The patient is not nervous/anxious.      Objective:   Blood pressure (!) 155/88, pulse 73, temperature 97.3 °F (36.3 °C), temperature source Temporal, resp. rate 16, height 5' 5.91" (1.674 m), weight 51.6 kg (113 lb 12.1 oz), SpO2 97 %.body mass index is 18.41 kg/m².    Physical Exam  Vitals and nursing note reviewed.   Constitutional:       Appearance: Normal appearance.   HENT:      Head: Normocephalic.   Cardiovascular:      Rate and Rhythm: Normal rate and regular rhythm.      Heart sounds: Normal heart sounds.   Pulmonary:      Effort: Pulmonary effort is normal.      Breath sounds: Normal breath sounds.   Abdominal:      General: Bowel sounds are normal. There is no distension.      Palpations: Abdomen is soft.      Tenderness: There is no abdominal tenderness.   Musculoskeletal:         General: Normal range of motion.      Comments: LUE AV graft + thrill    Skin:     General: Skin is warm and dry.   Neurological:      General: No focal deficit present.      Mental Status: He is alert.   Psychiatric:         Behavior: Behavior normal.       Labs:  Lab Results   Component Value Date    WBC 8.86 05/03/2023    HGB 13.5 (L) 05/03/2023    HCT 40.4 05/03/2023     (L) 05/03/2023    K 4.1 05/03/2023    CL 91 (L) 05/03/2023    CO2 28 05/03/2023    BUN 15 05/03/2023    CREATININE 3.9 (H) 05/03/2023    EGFRNORACEVR 17.9 (A) 05/03/2023    CALCIUM 10.1 05/03/2023    PHOS 3.4 05/03/2023    MG 2.3 05/19/2021    ALBUMIN 4.7 05/03/2023    AST 22 05/03/2023    ALT 12 05/03/2023    UTPCR 0.34 (H) 05/31/2021    .9 (H) 05/03/2023       Lab Results   Component Value Date    BILIRUBINUA Negative 05/18/2021    LIPASE 19 02/15/2022    PROTEINUA Negative 05/18/2021    NITRITE Negative 05/18/2021    RBCUA 1 09/14/2020    WBCUA 1 09/14/2020 "       Labs were reviewed with the patient.    Assessment:     1. Pre-transplant evaluation for kidney transplant    2. ESRD on hemodialysis    3. Coronary artery disease involving native coronary artery of native heart without angina pectoris    4. Essential hypertension    5. History of tobacco abuse    6. Mixed hyperlipidemia      Plan:   52 y.o. year old  male who has presented to be evaluated as a potential kidney transplant recipient.  He has ESRD secondary to HTN.  Patient is currently on hemodialysis started on 6/23/2022. Will need updated cardiac testing as well as clearance due to CAD. He will need smoking cessation as he has known vascular disease. Will need low dose CT chest (smoking history) as well as colonoscopy.     Transplant Candidacy:   Based on available information, Mr. Marin is a high-risk kidney transplant candidate due to cardiac history, tobacco use.   Meets center eligibility for accepting HCV+ donor offer - Yes.  Patient educated on HCV+ donors. [facility] is willing to accept HCV+ donor offer - Yes   Patient is a candidate for KDPI > 85 kidney donor offer - Yes.  Final determination of transplant candidacy will be made once workup is complete and reviewed by the selection committee.    Patient advised that it is recommended that all transplant candidates, and their close contacts and household members receive Covid vaccination.    UNOS Patient Status  Functional Status: 90% - Able to carry on normal activity: minor symptoms of disease    Alona Londono NP

## 2023-05-03 NOTE — PROGRESS NOTES
PHARM.D. PRE-TRANSPLANT NOTE:    This patient's medication therapy was evaluated as part of his pre-transplant evaluation.      The following general pharmacologic concerns were noted: Patient currently on clopidigrel (bypass surgery >3 years ago).    The following concerns for post-operative pain management were noted: None    The following pharmacologic concerns related to HCV therapy were noted: None      This patient's medication profile was reviewed for considerations for DAA Hepatitis C therapy:    [X]  No current inducers of CYP 3A4 or PGP  [X]  No amiodarone on this patient's EMR profile in the last 24 months  [X]  No past or current atrial fibrillation on this patient's EMR profile       Current Outpatient Medications   Medication Sig Dispense Refill    amLODIPine (NORVASC) 10 MG tablet Take 1 tablet (10 mg total) by mouth once daily. 90 tablet 3    atorvastatin (LIPITOR) 80 MG tablet Take 1 tablet (80 mg total) by mouth every evening. 90 tablet 3    carvediloL (COREG) 25 MG tablet Take 1 tablet (25 mg total) by mouth 2 (two) times daily with meals. 180 tablet 3    clopidogreL (PLAVIX) 75 mg tablet Take 1 tablet (75 mg total) by mouth once daily. 90 tablet 3    furosemide (LASIX) 40 MG tablet Take 1 tablet (40 mg total) by mouth as needed. 90 tablet 11    hydrALAZINE (APRESOLINE) 25 MG tablet Take 25 mg by mouth 3 (three) times daily.      lisinopriL (PRINIVIL,ZESTRIL) 2.5 MG tablet Take 1 tablet (2.5 mg total) by mouth every evening. 90 tablet 3     No current facility-administered medications for this visit.           I am available for consultation and can be contacted, as needed by the other members of the Transplant team.

## 2023-05-04 ENCOUNTER — TELEPHONE (OUTPATIENT)
Dept: INFECTIOUS DISEASES | Facility: CLINIC | Age: 53
End: 2023-05-04
Payer: MEDICARE

## 2023-05-04 NOTE — TELEPHONE ENCOUNTER
----- Message from Judah Schulte PA-C sent at 5/4/2023  1:15 PM CDT -----  Hi can you schedule pt to be seen in ID clinic for +quantgold     Thank you!    ----- Message -----  From: Lis De Jesus NP  Sent: 5/4/2023   1:09 PM CDT  To: Judah Schulte PA-C, #    TB gold Positive  ID to manage

## 2023-05-05 ENCOUNTER — EPISODE CHANGES (OUTPATIENT)
Dept: TRANSPLANT | Facility: CLINIC | Age: 53
End: 2023-05-05

## 2023-05-05 NOTE — PROGRESS NOTES
Transplant Surgery  Kidney Transplant Recipient Evaluation    Referring Physician: Sam Berrios  Current Nephrologist: Sam Berrios    Subjective:     Reason for Visit: evaluate transplant candidacy    History of Present Illness: Arcelia Marin is a 52 y.o. year old male undergoing transplant evaluation.    Dialysis History: Arcelia is on hemodialysis.      Transplant History: N/A    Etiology of Renal Disease: Malignant Hypertension (based on medical records from referral).    External provider notes reviewed: Yes    Review of Systems   Constitutional: Negative.    Respiratory: Negative.     Cardiovascular: Negative.    Gastrointestinal: Negative.    Genitourinary: Negative.    Objective:     Physical Exam:  Constitutional:   Vitals reviewed: yes   Well-nourished and well-groomed: yes  Eyes:   Sclerae icteric: no   Extraocular movements intact: yes  GI:    Bowel sounds normal: yes   Tenderness: no    If yes, quadrant/location: not applicable   Palpable masses: no    If yes, quadrant/location: not applicable   Hepatosplenomegaly: no   Ascites: no   Hernia: no    If yes, type/location: not applicable   Surgical scars: yes    If yes, type/location: midline  not applicable  Resp:   Effort normal: yes   Breath sounds normal: yes    CV:   Regular rate and rhythm: yes   Heart sounds normal: yes   Femoral pulses normal: yes   Extremities edematous: no  Skin:   Rashes or lesions present: no    If yes, describe:not applicable   Jaundice:: no    Musculoskeletal:   Gait normal: yes   Strength normal: yes  Psych:   Oriented to person, place, and time: yes   Affect and mood normal: yes    Additional comments: not applicable    Diagnostics:  The following labs have been reviewed: CBC  CMP  PT  INR  The following radiology images have been independently reviewed and interpreted: Iliac doppler  CT Abd/Pelvis    Counseling: We provided Arcelia Marin with a group education session today.  We discussed kidney  transplantation at length with him, including risks, potential complications, and alternatives in the management of his renal failure.  The discussion included complications related to anesthesia, bleeding, infection, primary nonfunction, and ATN.  I discussed the typical postoperative course, length of hospitalization, the need for long-term immunosuppression, and the need for long-term routine follow-up.  I discussed living-donor and -donor transplantation and the relative advantages and disadvantages of each.  I also discussed average waiting times for both living donation and  donation.  I discussed national and center-specific survival rates.  I also mentioned the potential benefit of multicenter listing to candidates listed with centers within more than one organ procurement organization.  All questions were answered.    Patient advised that it is recommended that all transplant candidates, and their close contacts and household members receive Covid vaccination.    Final determination of transplant candidacy will be made once evaluation is complete and reviewed by the Kidney & Kidney/Pancreas Selection Committee.    Coronavirus disease (COVID-19) caused by severe acute respiratory virus coronavirus 2 (SARS-C0V 2) is associated with increased mortality in solid organ transplant recipients (SOT) compared to non-transplant patients. Vaccine responses to vaccination are depressed against SARS-CoV2 compared to normal individuals but improve with third vaccination doses. Vaccination prior to SOT provides both the best opportunity for transplant candidates to develop protective immunity and to reduce the risk of serious COVID19 infections post transplantation. Organ transplant candidates at [facility] will be required to receive SARS-CoV-2 vaccination prior to being listed with a an active status, whenever possible. Exceptions will be made for disability related  reasons or for sincerely held [employer]ist beliefs.          Transplant Surgery - Candidacy   Assessment/Plan:   Arcelia Marin has end stage renal disease (ESRD) on dialysis. I have concerns with vascular disease and medical comorbidities. Based on available information, Arcelia Marin is a marginal kidney transplant candidate.     Abdominal Habitus: Not prohibitive.     Vascular/Technical Concerns: History of aortobifem bypass. The patient is on plavix. In the setting of previous aortobifem bypass and monophasic waveforms on doppler, he is marginal candidate for transplantation.    Urologic Concerns: None based on available information. Patient reports normal volume of urine per day.  No history of recurrent UTI, no apparent obstructive symptoms or voiding dysfunction, no history of self catheterization    Other surgical considerations/concerns:  Extensive vascular disease history including CAD, PAD, and carotid stenosis.  S/p CABG and aortobifem. History of CVA.  History of significantly reduced EF, which has since improved.  Patient continues to actively smoke.     Additional testing to be completed according to the Written Order Guidelines for Adult Pre-kidney and Pancreas Transplant Evaluation (KI-02).  Interpretation of tests and discussion of patient management involves all members of the multidisciplinary transplant team.    Austyn Cabrera MD

## 2023-05-08 ENCOUNTER — TELEPHONE (OUTPATIENT)
Dept: TRANSPLANT | Facility: CLINIC | Age: 53
End: 2023-05-08
Payer: MEDICARE

## 2023-05-08 NOTE — TELEPHONE ENCOUNTER
"SW returned pt's call. Pt reports he no longer wants to go through with kidney transplant evaluation.     Pt reports believing he is "too high risk" for another surgery due to previous heart issues.  Pt reports difficulty sleeping since clinic visit and discussing decision with family. Pt reports wanting to "try to do what's best for myself" and SW provided validation and active listening. SW and pt discussed alternative treatment options. Pt told SW he had concerns about the surgeons "putting the kidney in my leg or wherever" and SW strongly suggested pt speak with the medical team to ensure pt has all information regarding transplant. SW sent above to pt's transplant nurse coordinator, Cristo Bee RN via staff in-basket message.     Melissa Bingham LMSW    "

## 2023-05-10 ENCOUNTER — OFFICE VISIT (OUTPATIENT)
Dept: INFECTIOUS DISEASES | Facility: CLINIC | Age: 53
End: 2023-05-10
Payer: MEDICARE

## 2023-05-10 VITALS
SYSTOLIC BLOOD PRESSURE: 180 MMHG | BODY MASS INDEX: 18.88 KG/M2 | HEART RATE: 87 BPM | DIASTOLIC BLOOD PRESSURE: 95 MMHG | WEIGHT: 116.63 LBS | TEMPERATURE: 99 F

## 2023-05-10 DIAGNOSIS — Z22.7 LATENT TUBERCULOSIS BY BLOOD TEST: Primary | ICD-10-CM

## 2023-05-10 PROCEDURE — 99999 PR PBB SHADOW E&M-EST. PATIENT-LVL III: CPT | Mod: PBBFAC,TXP,, | Performed by: INTERNAL MEDICINE

## 2023-05-10 PROCEDURE — 1159F PR MEDICATION LIST DOCUMENTED IN MEDICAL RECORD: ICD-10-PCS | Mod: CPTII,S$GLB,TXP, | Performed by: INTERNAL MEDICINE

## 2023-05-10 PROCEDURE — 4010F PR ACE/ARB THEARPY RXD/TAKEN: ICD-10-PCS | Mod: CPTII,S$GLB,TXP, | Performed by: INTERNAL MEDICINE

## 2023-05-10 PROCEDURE — 3080F DIAST BP >= 90 MM HG: CPT | Mod: CPTII,S$GLB,TXP, | Performed by: INTERNAL MEDICINE

## 2023-05-10 PROCEDURE — 3008F BODY MASS INDEX DOCD: CPT | Mod: CPTII,S$GLB,TXP, | Performed by: INTERNAL MEDICINE

## 2023-05-10 PROCEDURE — 3008F PR BODY MASS INDEX (BMI) DOCUMENTED: ICD-10-PCS | Mod: CPTII,S$GLB,TXP, | Performed by: INTERNAL MEDICINE

## 2023-05-10 PROCEDURE — 3077F SYST BP >= 140 MM HG: CPT | Mod: CPTII,S$GLB,TXP, | Performed by: INTERNAL MEDICINE

## 2023-05-10 PROCEDURE — 1160F RVW MEDS BY RX/DR IN RCRD: CPT | Mod: CPTII,S$GLB,TXP, | Performed by: INTERNAL MEDICINE

## 2023-05-10 PROCEDURE — 99214 OFFICE O/P EST MOD 30 MIN: CPT | Mod: S$GLB,TXP,, | Performed by: INTERNAL MEDICINE

## 2023-05-10 PROCEDURE — 1160F PR REVIEW ALL MEDS BY PRESCRIBER/CLIN PHARMACIST DOCUMENTED: ICD-10-PCS | Mod: CPTII,S$GLB,TXP, | Performed by: INTERNAL MEDICINE

## 2023-05-10 PROCEDURE — 4010F ACE/ARB THERAPY RXD/TAKEN: CPT | Mod: CPTII,S$GLB,TXP, | Performed by: INTERNAL MEDICINE

## 2023-05-10 PROCEDURE — 3080F PR MOST RECENT DIASTOLIC BLOOD PRESSURE >= 90 MM HG: ICD-10-PCS | Mod: CPTII,S$GLB,TXP, | Performed by: INTERNAL MEDICINE

## 2023-05-10 PROCEDURE — 1159F MED LIST DOCD IN RCRD: CPT | Mod: CPTII,S$GLB,TXP, | Performed by: INTERNAL MEDICINE

## 2023-05-10 PROCEDURE — 3077F PR MOST RECENT SYSTOLIC BLOOD PRESSURE >= 140 MM HG: ICD-10-PCS | Mod: CPTII,S$GLB,TXP, | Performed by: INTERNAL MEDICINE

## 2023-05-10 PROCEDURE — 99999 PR PBB SHADOW E&M-EST. PATIENT-LVL III: ICD-10-PCS | Mod: PBBFAC,TXP,, | Performed by: INTERNAL MEDICINE

## 2023-05-10 PROCEDURE — 99214 PR OFFICE/OUTPT VISIT, EST, LEVL IV, 30-39 MIN: ICD-10-PCS | Mod: S$GLB,TXP,, | Performed by: INTERNAL MEDICINE

## 2023-05-10 RX ORDER — ISOSORBIDE DINITRATE AND HYDRALAZINE HYDROCHLORIDE 37.5; 2 MG/1; MG/1
TABLET ORAL
COMMUNITY

## 2023-05-10 RX ORDER — MINOXIDIL 2.5 MG/1
TABLET ORAL
COMMUNITY

## 2023-05-10 RX ORDER — RIFAMPIN 300 MG/1
600 CAPSULE ORAL DAILY
Qty: 60 CAPSULE | Refills: 3 | Status: SHIPPED | OUTPATIENT
Start: 2023-05-10 | End: 2023-09-07

## 2023-05-10 RX ORDER — CHLORTHALIDONE 25 MG/1
TABLET ORAL
COMMUNITY

## 2023-05-10 RX ORDER — HYDRALAZINE HYDROCHLORIDE 50 MG/1
TABLET, FILM COATED ORAL
COMMUNITY
Start: 2023-05-02

## 2023-05-10 NOTE — PATIENT INSTRUCTIONS
Please get lab work once a month to check liver    Avoid consuming alcoholic beverages    Call the clinic if you develop any side effects from the antibiotics

## 2023-05-10 NOTE — PROGRESS NOTES
Infectious Disease Clinic  [facility]    Subjective:      Patient ID:. Arcelia Marin is a 52 y.o. male       Chief Complaint:   Chief Complaint   Patient presents with    latent tuberculosis       History of Present Illness    A 52 y.o. male patient with CAD, HTN, ESRD on HD (TTS) who is seen for latent tuberculosis by blood testing. Mr. Marin has Quantiferon Gold Tb testing performed as part of his kidney transplant evaluation. He denies fever, chills, cough, hemoptysis, night sweats, and unexplained weight loss. His weight does fluctuate 1-2 lbs with his HD sessions. He has no complaints today and says he was not informed of the positive result prior to his visit.     Review of Systems   Constitutional: Positive for decreased appetite and weight loss (with HD sessions.). Negative for chills, diaphoresis, fever, malaise/fatigue and night sweats.   HENT:  Negative for congestion, hearing loss, hoarse voice and odynophagia.    Eyes:  Negative for blurred vision.   Cardiovascular:  Negative for chest pain, dyspnea on exertion, irregular heartbeat, leg swelling and palpitations.   Respiratory:  Negative for cough, shortness of breath and wheezing.    Endocrine: Negative for cold intolerance, heat intolerance, polydipsia, polyphagia and polyuria.   Hematologic/Lymphatic: Negative for adenopathy and bleeding problem. Does not bruise/bleed easily.   Skin:  Negative for color change, dry skin, itching, nail changes and rash.   Musculoskeletal:  Negative for back pain, joint pain, joint swelling, muscle weakness and myalgias.   Gastrointestinal:  Negative for bloating, abdominal pain, anorexia, change in bowel habit, constipation, diarrhea, dysphagia, heartburn, nausea and vomiting.   Genitourinary:  Negative for bladder incontinence, dysuria, flank pain, frequency, hesitancy, incomplete emptying, nocturia and urgency.   Neurological:  Negative for dizziness, headaches, light-headedness, numbness,  paresthesias, vertigo and weakness.   Psychiatric/Behavioral:  Negative for altered mental status, depression and memory loss. The patient does not have insomnia and is not nervous/anxious.      Objective:     Physical Exam  Vitals and nursing note reviewed.   Constitutional:       Appearance: He is well-developed.   HENT:      Head: Normocephalic and atraumatic.   Eyes:      General: No scleral icterus.        Right eye: No discharge.         Left eye: No discharge.      Conjunctiva/sclera: Conjunctivae normal.   Pulmonary:      Effort: Pulmonary effort is normal.   Musculoskeletal:         General: Normal range of motion.   Skin:     General: Skin is warm and dry.   Neurological:      Mental Status: He is alert and oriented to person, place, and time.   Psychiatric:         Behavior: Behavior normal.         Thought Content: Thought content normal.         Judgment: Judgment normal.       Assessment:       ICD-10-CM ICD-9-CM   1. Latent tuberculosis by blood test  Z22.7 790.6       Plan:   I have reviewed clinic notes, laboratory, imaging tests, and pathology from the referring provider and other providers, as indicated for this visit, with my interpretation as documented.     Patient does not have signs or symptoms of active pulmonary tuberculosis. I have reviewed chest imaging which shows no changes concerning for active pulmonary tuberculosis.   Discussed diagnosis of latent tuberculosis, disease course, and CDC recommended treatment options.   Patient counseled regarding risks and benefits or prophylactic therapy.   Patient has elected to take prophylactic therapy.   Patient has elected to undergo therapy with:   Rifampin 10 mg/kg or 600 mg PO daily for 4 months. (Max dose 600 mg)  Patient was counseled about possible side effects related to the medication.   The patient was asked to inform me if there are any problems tolerating the medication.   Will monitor ALT/CMP two weeks after initiation of therapy and  monthly thereafter if no abnormalities.   The majority of this visit was spent reviewing results, discussing the implications, and answering the patient's questions regarding latent TB.   Orders Placed This Encounter    ALT (SGPT)    rifAMpin (RIFADIN) 300 MG capsule

## 2023-05-10 NOTE — PROGRESS NOTES
Prior aortobifem and has monophasic doppler  Not a candidate, in my opinion. Sending to Dr. Roman

## 2023-05-12 ENCOUNTER — COMMITTEE REVIEW (OUTPATIENT)
Dept: TRANSPLANT | Facility: CLINIC | Age: 53
End: 2023-05-12
Payer: MEDICARE

## 2023-05-12 NOTE — COMMITTEE REVIEW
Native Organ Dx: Malignant Hypertension      Not approved for LRD/CAD transplant due to extensive cardiovascular disease and multiple co-morbidities, as evidenced by, a hx of CAD, PAD, carotid stenosis, CVA, currently taking Plavix, bilateral monophasic waveforms on iliac doppler, and patient is actively smoking.     Patient informed of committee's decision. Reports understanding. Denies questions or concerns at this time.      Note written by FIORDALIZA Bee RN     ===============================================    I was present at the meeting and attest to the decision of the committee

## 2023-05-12 NOTE — LETTER
May 12, 2023    Yusufyamil Marin  [address]  [place] [address]    Dear Arcelia Marin:  MRN: [id number]    It is the duty of the [facility] to determine which patients are candidates for a transplant. For this reason, our committee has the difficult task of evaluating patients to determine which ones have the greatest chance of having a successful transplant. We are aware of the magnitude of this responsibility, and we approach it with reverence and humility.    It is with regret I inform you that you are not approved as a transplant candidate due to  extensive cardiovascular disease  .  Based on this review, we have determined that at this time, you are not a candidate for a transplant at [facility].      The selection committee carefully considers each patient's transplant candidacy and determines whether it is safe to proceed with transplantation on a case-by-case basis using established selection criteria.  At present, the risk of proceeding with an elective transplant surgery has become too high.                                                                               Although the selection committee believes you are not a suitable transplant candidate, you have the option to be evaluated at other transplant centers who may have different selection criteria.  You may request your [facility] records be sent to any center of your choice by contacting our Medical Records Department at [phone].                                                                               Attached is a letter from the [employer] for Organ Sharing (UNOS).  It describes the services and information offered to patients by UN[employer] and the Organ Procurement and Transplant Network.    The [facility] sincerely wishes you the best and remains available to answer any questions.  Please do not hesitate to contact our pre-transplant office if we can assist you in any other  way.                                                                               Sincerely,      Nora Solitario MD  Medical Director, Kidney & Kidney/Pancreas Transplantation    Cc: Dr.Shivakanth Berrios         [facility]               The Organ Procurement and Transplantation Network   Toll-free patient services line: Your resource for organ transplant information     If you have a question regarding your own medical care, you always should call your transplant hospital first. However, for general organ transplant-related information, you can call the Organ Procurement and Transplantation Network (OPTN) toll-free patient services line at [phone].     Anyone, including potential transplant candidates, candidates, recipients, family members, friends, living donors, and donor family members, can call this number to:     Talk about organ donation, living donation, the transplant process, the donation process, and transplant policies.   Get a free patient information kit with helpful booklets, waiting list and transplant information, and a list of all transplant hospitals.   Ask questions about the OPTN website (https://optn.transplant.hrsa.gov/), the [employer] Organ Sharings (UNOS) website (https://unos.org/), or the UNOS website for living donors and transplant recipients. (https://www.transplantliving.org/).   Learn how the OPTN can help you.   Talk about any concerns that you may have with a transplant hospital.     The nations transplant system, the OPTN, is managed under federal contract by the [employer] Organ Sharing (UNOS), which is a non-profit charitable organization. The OPTN helps create and define organ sharing policies that make the best use of donated organs. This process continuously evaluating new advances and discoveries so policies can be adapted to best serve patients waiting for transplants. To do so, the OPTN works closely with transplant  professionals, transplant patients, transplant candidates, donor families, living donors, and the public. All transplant programs and organ procurement organizations throughout the country are OPTN members and are obligated to follow the policies the OPTN creates for allocating organs.     The OPTN also is responsible for:   Providing educational material for patients, the public, and professionals.   Raising awareness of the need for donated organs and tissue.   Coordinating organ procurement, matching, and placement.   Collecting information about every organ transplant and donation that occurs in the [place].     Remember, you should contact your transplant hospital directly if you have questions or concerns about your own medical care including medical records, work-up progress, and test results.     We are not your transplant hospital, and our staff will not be able to answer questions about your case, so please keep your transplant hospitals phone number handy.   However, while you research your transplant needs and learn as much as you can about transplantation and donation, we welcome your call to our toll-free patient services line at [phone].

## 2023-05-15 ENCOUNTER — TELEPHONE (OUTPATIENT)
Dept: TRANSPLANT | Facility: CLINIC | Age: 53
End: 2023-05-15
Payer: MEDICARE

## 2024-09-20 ENCOUNTER — HOSPITAL ENCOUNTER (OUTPATIENT)
Facility: HOSPITAL | Age: 54
Discharge: HOME | End: 2024-09-23
Payer: MEDICARE

## 2024-09-20 ENCOUNTER — TRANSCRIBE ORDERS (OUTPATIENT)
Facility: HOSPITAL | Age: 54
End: 2024-09-20

## 2024-09-20 DIAGNOSIS — R76.11 POSITIVE TB TEST: ICD-10-CM

## 2024-09-20 DIAGNOSIS — R76.11 POSITIVE TB TEST: Primary | ICD-10-CM

## 2024-09-20 PROCEDURE — 71046 X-RAY EXAM CHEST 2 VIEWS: CPT

## 2025-05-27 ENCOUNTER — TELEPHONE (OUTPATIENT)
Dept: CARDIOLOGY | Facility: HOSPITAL | Age: 55
End: 2025-05-27
Payer: MEDICARE

## 2025-05-27 NOTE — TELEPHONE ENCOUNTER
Dr. Barksdale's office sent referral for thrombectomy of left arm basilic fistula. Fistula was created in 2023 and likely has not had intervention since creation. Last dialysis was Saturday and was without issues.     HD nurse reports thrill proximally on fistula only. They deny issues prior to attempting treatment today.     Called patient without success. Dr. Barksdale's office was notified and they were able to reach patient and transfer call. Offered patient to come in tomorrow for declot and he is not able due to transportation issues. Scheduled him for Thursday and will set up medical transportation. Instructions were given to patient.     -ZT

## 2025-05-29 ENCOUNTER — HOSPITAL ENCOUNTER (OUTPATIENT)
Facility: HOSPITAL | Age: 55
Discharge: HOME | End: 2025-05-29
Attending: STUDENT IN AN ORGANIZED HEALTH CARE EDUCATION/TRAINING PROGRAM | Admitting: STUDENT IN AN ORGANIZED HEALTH CARE EDUCATION/TRAINING PROGRAM
Payer: MEDICARE

## 2025-05-29 VITALS
TEMPERATURE: 98 F | WEIGHT: 114.88 LBS | OXYGEN SATURATION: 96 % | BODY MASS INDEX: 18.03 KG/M2 | HEART RATE: 61 BPM | SYSTOLIC BLOOD PRESSURE: 136 MMHG | HEIGHT: 67 IN | DIASTOLIC BLOOD PRESSURE: 77 MMHG

## 2025-05-29 DIAGNOSIS — T82.868A THROMBOSIS OF KIDNEY DIALYSIS ARTERIOVENOUS GRAFT, INITIAL ENCOUNTER: ICD-10-CM

## 2025-05-29 DIAGNOSIS — N18.6 ESRD (END STAGE RENAL DISEASE) ON DIALYSIS: Primary | ICD-10-CM

## 2025-05-29 DIAGNOSIS — Z99.2 ESRD (END STAGE RENAL DISEASE) ON DIALYSIS: Primary | ICD-10-CM

## 2025-05-29 LAB
ANION GAP SERPL CALC-SCNC: 15 MEQ/L
BUN SERPL-MCNC: 42 MG/DL (ref 8.4–25.7)
CALCIUM SERPL-MCNC: 9.2 MG/DL (ref 8.4–10.2)
CHLORIDE SERPL-SCNC: 101 MMOL/L (ref 98–107)
CO2 SERPL-SCNC: 23 MMOL/L (ref 22–29)
CREAT SERPL-MCNC: 7.67 MG/DL (ref 0.72–1.25)
CREAT/UREA NIT SERPL: 5
GFR SERPLBLD CREATININE-BSD FMLA CKD-EPI: 8 ML/MIN/1.73/M2
GLUCOSE SERPL-MCNC: 95 MG/DL (ref 74–100)
POTASSIUM SERPL-SCNC: 3.8 MMOL/L (ref 3.5–5.1)
SODIUM SERPL-SCNC: 139 MMOL/L (ref 136–145)

## 2025-05-29 PROCEDURE — 27201423 OPTIME MED/SURG SUP & DEVICES STERILE SUPPLY: Performed by: STUDENT IN AN ORGANIZED HEALTH CARE EDUCATION/TRAINING PROGRAM

## 2025-05-29 PROCEDURE — 99203 OFFICE O/P NEW LOW 30 MIN: CPT | Mod: 25,,, | Performed by: STUDENT IN AN ORGANIZED HEALTH CARE EDUCATION/TRAINING PROGRAM

## 2025-05-29 PROCEDURE — 80048 BASIC METABOLIC PNL TOTAL CA: CPT | Performed by: STUDENT IN AN ORGANIZED HEALTH CARE EDUCATION/TRAINING PROGRAM

## 2025-05-29 PROCEDURE — 99152 MOD SED SAME PHYS/QHP 5/>YRS: CPT | Mod: ,,, | Performed by: STUDENT IN AN ORGANIZED HEALTH CARE EDUCATION/TRAINING PROGRAM

## 2025-05-29 PROCEDURE — C1769 GUIDE WIRE: HCPCS | Performed by: STUDENT IN AN ORGANIZED HEALTH CARE EDUCATION/TRAINING PROGRAM

## 2025-05-29 PROCEDURE — 99153 MOD SED SAME PHYS/QHP EA: CPT | Performed by: STUDENT IN AN ORGANIZED HEALTH CARE EDUCATION/TRAINING PROGRAM

## 2025-05-29 PROCEDURE — 36558 INSERT TUNNELED CV CATH: CPT | Performed by: STUDENT IN AN ORGANIZED HEALTH CARE EDUCATION/TRAINING PROGRAM

## 2025-05-29 PROCEDURE — 63600175 PHARM REV CODE 636 W HCPCS: Mod: JZ,TB | Performed by: STUDENT IN AN ORGANIZED HEALTH CARE EDUCATION/TRAINING PROGRAM

## 2025-05-29 PROCEDURE — 36415 COLL VENOUS BLD VENIPUNCTURE: CPT | Performed by: STUDENT IN AN ORGANIZED HEALTH CARE EDUCATION/TRAINING PROGRAM

## 2025-05-29 PROCEDURE — C1894 INTRO/SHEATH, NON-LASER: HCPCS | Performed by: STUDENT IN AN ORGANIZED HEALTH CARE EDUCATION/TRAINING PROGRAM

## 2025-05-29 PROCEDURE — C1725 CATH, TRANSLUMIN NON-LASER: HCPCS | Performed by: STUDENT IN AN ORGANIZED HEALTH CARE EDUCATION/TRAINING PROGRAM

## 2025-05-29 PROCEDURE — 25500020 PHARM REV CODE 255: Performed by: STUDENT IN AN ORGANIZED HEALTH CARE EDUCATION/TRAINING PROGRAM

## 2025-05-29 PROCEDURE — 36558 INSERT TUNNELED CV CATH: CPT | Mod: 51,RT,, | Performed by: STUDENT IN AN ORGANIZED HEALTH CARE EDUCATION/TRAINING PROGRAM

## 2025-05-29 PROCEDURE — 36905 THRMBC/NFS DIALYSIS CIRCUIT: CPT | Mod: LT,,, | Performed by: STUDENT IN AN ORGANIZED HEALTH CARE EDUCATION/TRAINING PROGRAM

## 2025-05-29 PROCEDURE — 99152 MOD SED SAME PHYS/QHP 5/>YRS: CPT | Performed by: STUDENT IN AN ORGANIZED HEALTH CARE EDUCATION/TRAINING PROGRAM

## 2025-05-29 PROCEDURE — 76937 US GUIDE VASCULAR ACCESS: CPT | Mod: 26,,, | Performed by: STUDENT IN AN ORGANIZED HEALTH CARE EDUCATION/TRAINING PROGRAM

## 2025-05-29 PROCEDURE — C1887 CATHETER, GUIDING: HCPCS | Performed by: STUDENT IN AN ORGANIZED HEALTH CARE EDUCATION/TRAINING PROGRAM

## 2025-05-29 PROCEDURE — C1750 CATH, HEMODIALYSIS,LONG-TERM: HCPCS | Performed by: STUDENT IN AN ORGANIZED HEALTH CARE EDUCATION/TRAINING PROGRAM

## 2025-05-29 PROCEDURE — 76937 US GUIDE VASCULAR ACCESS: CPT | Performed by: STUDENT IN AN ORGANIZED HEALTH CARE EDUCATION/TRAINING PROGRAM

## 2025-05-29 PROCEDURE — 77001 FLUOROGUIDE FOR VEIN DEVICE: CPT | Mod: 26,XS,, | Performed by: STUDENT IN AN ORGANIZED HEALTH CARE EDUCATION/TRAINING PROGRAM

## 2025-05-29 PROCEDURE — 36905 THRMBC/NFS DIALYSIS CIRCUIT: CPT | Performed by: STUDENT IN AN ORGANIZED HEALTH CARE EDUCATION/TRAINING PROGRAM

## 2025-05-29 PROCEDURE — 77001 FLUOROGUIDE FOR VEIN DEVICE: CPT | Mod: XS | Performed by: STUDENT IN AN ORGANIZED HEALTH CARE EDUCATION/TRAINING PROGRAM

## 2025-05-29 DEVICE — GLIDEPATH HEMODIALYSIS CATH, ST, DL 14.5 FR. 19CM
Type: IMPLANTABLE DEVICE | Site: CHEST  WALL | Status: FUNCTIONAL
Brand: GLIDEPATH LONG-TERM HEMODIALYSIS CATHETER WITH PRELOADED STYLET

## 2025-05-29 RX ORDER — SODIUM CHLORIDE 0.9 % (FLUSH) 0.9 %
10 SYRINGE (ML) INJECTION
Status: DISCONTINUED | OUTPATIENT
Start: 2025-05-29 | End: 2025-05-29 | Stop reason: HOSPADM

## 2025-05-29 RX ORDER — LIDOCAINE HYDROCHLORIDE 10 MG/ML
INJECTION, SOLUTION INFILTRATION; PERINEURAL
Status: DISCONTINUED | OUTPATIENT
Start: 2025-05-29 | End: 2025-05-29 | Stop reason: HOSPADM

## 2025-05-29 RX ORDER — SODIUM BICARBONATE 650 MG/1
650 TABLET ORAL 4 TIMES DAILY
COMMUNITY

## 2025-05-29 RX ORDER — IOPAMIDOL 612 MG/ML
INJECTION, SOLUTION INTRAVASCULAR
Status: DISCONTINUED | OUTPATIENT
Start: 2025-05-29 | End: 2025-05-29 | Stop reason: HOSPADM

## 2025-05-29 RX ORDER — FENTANYL CITRATE 50 UG/ML
INJECTION, SOLUTION INTRAMUSCULAR; INTRAVENOUS
Status: DISCONTINUED | OUTPATIENT
Start: 2025-05-29 | End: 2025-05-29 | Stop reason: HOSPADM

## 2025-05-29 RX ORDER — MIDAZOLAM HYDROCHLORIDE 1 MG/ML
INJECTION INTRAMUSCULAR; INTRAVENOUS
Status: DISCONTINUED | OUTPATIENT
Start: 2025-05-29 | End: 2025-05-29 | Stop reason: HOSPADM

## 2025-05-29 NOTE — Clinical Note
Mini stick sheath was exchanged for peel away sheath. Catheter tip was inserted into sheath and sheath peeled away. Tip was verified under fluoroscopy. Ports aspirate and flush appropriately. Ports were locked with Heparin 1.6 ml each.

## 2025-05-29 NOTE — DISCHARGE SUMMARY
INTERVENTIONAL NEPHROLOGY DISCHARGE SUMMARY         Patient Name: Arcelia Marin   1970    Procedure Date: 2025      In brief, Mr. Marin underwent unsuccessful percutaneous thrombectomy of his left brachiocephalic arteriovenous fistula. He had a right internal jugular vein tunneled hemodialysis catheter inserted. We discussed the different surgical options and he agreed to follow up with Dr. Donis to further discuss and be evaluated.    Pre-Op Diagnosis: T82.868A Thrombosis of vascular prosthetic devices, implants and grafts, initial encounter, N18.6 End Stage Renal Disease (ESRD)  Post-Op Diagnosis: Same.    Discharge Instructions/Recommendations:  - Continue use of RIJ TDC for HD  - Recommend follow up with Dr Donis for evaluation of open thrombectomy and aneurysmorrhaphy vs aneurysmectomy vs excision w/ interposition graft vs new access creation  - Pt may be discharged after successful monitoring in post-op area.  - Pt may resume home medications.    Thank you for allowing me the opportunity in taking care of this patient. Please reach me with any questions.    Jose Francisco Tolliver MD  Interventional Nephrology  Cell: [phone]

## 2025-05-29 NOTE — Clinical Note
Tunneled tract was marked with skin marker and exit site created with small incision after lidocaine administration.

## 2025-05-29 NOTE — Clinical Note
The balloon was inflated with indeflator in the  . The balloon max pressure was 8 saida for 90 seconds

## 2025-05-29 NOTE — H&P
INTERVENTIONAL NEPHROLOGY HISTORY & PHYSICAL       Patient Name: Arcelia Wilkinsonkraig JONES 1970    Date: 2025  Time: 7:43 AM         HPI: 55 y.o. male with ESRD on HD via left brachiobasilic arteriovenous fistula who presents with thrombosed access. AVF created by Dr. Donis approximately 2 years ago. Last successful HD was Saturday. Tuesday, HD RN had difficulty w/ cannulation, multiple attempts made. Pt is being prepared for percutaneous thrombectomy today.    Pt seen and examined at bedside in CVSS this AM. Family is present. Risks and benefits of percutaneous thrombectomy and intravenous conscious sedation was reviewed with the patient. The patient agrees to proceed with the intended procedure. Consents for both intravenous conscious sedation and procedure were signed and placed within the chart.       Review of Systems:  General:  No fatigue  Skin: No rashes  HEENT: No vision changes  CVS: No CP  RS: No SOB  GIT: No abdominal pain  Extremities: No swelling  Neurological:  No focal weakness  Psych: No depression    Past Medical History:   Diagnosis Date    Allergy     Anemia     CHF (congestive heart failure)     Coronary artery disease     CVA (cerebral vascular accident)     Encounter for blood transfusion     Heart disease     Heart failure with reduced ejection fraction, NYHA class II 2021    Hypertension     Tobacco dependence     Vitamin D deficiency       Past Surgical History:   Procedure Laterality Date    CORONARY ARTERY BYPASS GRAFT      coronary bypass surgery      CREATION OF BILATERAL AORTOILIAC ARTERIAL BYPASS      KNEE SURGERY Left     NECK SURGERY        Review of patient's allergies indicates:   Allergen Reactions    Penicillins Hives      Social History[1]   Family History   Problem Relation Name Age of Onset    Heart attack Mother      Heart attack Father         Current Medications[2]    Vital Signs:        Physical Exam:  General: NAD  HEENT: NC/AT, EOMI  CVS:  RRR.  RS: breathing easily.  Abdominal: Soft, NT/ND.  Extremities: No edema b/l LE  Skin: No rash, no lesions.  Neurological: No focal deficits.  Psych: Normal affect  Dialysis Access: left brachiobasilic arteriovenous fistula w/o thrill, pulsatile throughout from AA to mBOF, 2 aneurysmal segments noted w/ severe depigmentation, small drop of fresh blood noted from previous cannulation attempt at mBOF    Results:    Lab Results   Component Value Date     (L) 05/03/2023    K 4.1 05/03/2023    CL 91 (L) 05/03/2023    CO2 28 05/03/2023    BUN 15 05/03/2023    CREATININE 3.9 (H) 05/03/2023     (H) 05/03/2023     Lab Results   Component Value Date    WBC 8.86 05/03/2023    HGB 11.1 (L) 05/13/2025    HGB 13.5 (L) 05/03/2023     05/03/2023     (H) 05/03/2023       Assessment and Plan:      ESRD on HD via left brachiobasilic arteriovenous fistula.  Thrombosis of AVF.  Pt with ESRD on HD via left brachiobasilic arteriovenous fistula who presents today with thrombosed access. The pt is being prepared for percutaneous thrombectomy today.  - Consents obtained and placed within chart.  - Will proceed in cath lab setting today.    Please feel free to reach me with any questions.    Jose Francisco Tolliver MD  Interventional Nephrology  Cell: [phone]       [1]   Social History  Tobacco Use    Smoking status: Every Day     Current packs/day: 0.50     Types: Cigarettes    Smokeless tobacco: Former   Substance Use Topics    Alcohol use: Yes     Comment: 1-2 beers every other day    Drug use: Not Currently   [2]   Current Facility-Administered Medications:     sodium chloride 0.9% flush 10 mL, 10 mL, Intravenous, PRN, Jose Francisco Tolliver MD

## (undated) DEVICE — GLIDE CATH ANGLED 4FR 65CM

## (undated) DEVICE — SUT MONOCRYL 3-0 PS-2 UND

## (undated) DEVICE — DRESSING TEGADERM CHG 3.5X4.5

## (undated) DEVICE — FORCEP HEMOSTAT MOSQUITO STR

## (undated) DEVICE — DRESSING LEUKOPLAST FLEX 1X3IN

## (undated) DEVICE — FLOWSWITCH HP 1-W W/O LL

## (undated) DEVICE — PRESTO INFLATION DEVICE

## (undated) DEVICE — KIT MINI STK MAX COAX 5FR 10CM

## (undated) DEVICE — ADHESIVE DERMABOND ADVANCED

## (undated) DEVICE — SHEATH PINNACLE 7FR HIFLO

## (undated) DEVICE — Device

## (undated) DEVICE — TRAY CENT PREM SUT REM PK SGL

## (undated) DEVICE — SUT SILK 0 BLK BR CT-1 30IN

## (undated) DEVICE — DRAPE UTILITY W/ TAPE 20X30IN

## (undated) DEVICE — COVER PROBE US 5.5X58L NON LTX

## (undated) DEVICE — CANNULA ADULT NASAL 7FT

## (undated) DEVICE — GUIDEWIRE STD .035X180CM ANG

## (undated) DEVICE — BLLN DORADO 80X6X6

## (undated) DEVICE — SHEATH BRITE TIP WIRE 8F 5.5CM

## (undated) DEVICE — TOWEL OR DISP STRL BLUE 4/PK

## (undated) DEVICE — NDL PERC ENTRY BSDN 18-7.0